# Patient Record
Sex: MALE | Race: WHITE | Employment: FULL TIME | URBAN - METROPOLITAN AREA
[De-identification: names, ages, dates, MRNs, and addresses within clinical notes are randomized per-mention and may not be internally consistent; named-entity substitution may affect disease eponyms.]

---

## 2017-05-22 ENCOUNTER — OFFICE VISIT (OUTPATIENT)
Dept: FAMILY MEDICINE | Facility: CLINIC | Age: 32
End: 2017-05-22

## 2017-05-22 VITALS
SYSTOLIC BLOOD PRESSURE: 113 MMHG | DIASTOLIC BLOOD PRESSURE: 67 MMHG | WEIGHT: 161 LBS | HEIGHT: 75 IN | TEMPERATURE: 98.5 F | HEART RATE: 70 BPM | BODY MASS INDEX: 20.02 KG/M2

## 2017-05-22 DIAGNOSIS — A74.9 CHLAMYDIA INFECTION: ICD-10-CM

## 2017-05-22 DIAGNOSIS — Z11.3 SCREENING FOR STDS (SEXUALLY TRANSMITTED DISEASES): Primary | ICD-10-CM

## 2017-05-22 PROCEDURE — 87491 CHLMYD TRACH DNA AMP PROBE: CPT | Performed by: NURSE PRACTITIONER

## 2017-05-22 PROCEDURE — 87389 HIV-1 AG W/HIV-1&-2 AB AG IA: CPT | Performed by: NURSE PRACTITIONER

## 2017-05-22 PROCEDURE — 87340 HEPATITIS B SURFACE AG IA: CPT | Performed by: NURSE PRACTITIONER

## 2017-05-22 PROCEDURE — 87591 N.GONORRHOEAE DNA AMP PROB: CPT | Performed by: NURSE PRACTITIONER

## 2017-05-22 PROCEDURE — 36415 COLL VENOUS BLD VENIPUNCTURE: CPT | Performed by: NURSE PRACTITIONER

## 2017-05-22 PROCEDURE — 86803 HEPATITIS C AB TEST: CPT | Performed by: NURSE PRACTITIONER

## 2017-05-22 ASSESSMENT — ANXIETY QUESTIONNAIRES
5. BEING SO RESTLESS THAT IT IS HARD TO SIT STILL: SEVERAL DAYS
2. NOT BEING ABLE TO STOP OR CONTROL WORRYING: SEVERAL DAYS
GAD7 TOTAL SCORE: 12
3. WORRYING TOO MUCH ABOUT DIFFERENT THINGS: NEARLY EVERY DAY
7. FEELING AFRAID AS IF SOMETHING AWFUL MIGHT HAPPEN: NOT AT ALL
IF YOU CHECKED OFF ANY PROBLEMS ON THIS QUESTIONNAIRE, HOW DIFFICULT HAVE THESE PROBLEMS MADE IT FOR YOU TO DO YOUR WORK, TAKE CARE OF THINGS AT HOME, OR GET ALONG WITH OTHER PEOPLE: VERY DIFFICULT
6. BECOMING EASILY ANNOYED OR IRRITABLE: NEARLY EVERY DAY
1. FEELING NERVOUS, ANXIOUS, OR ON EDGE: NEARLY EVERY DAY

## 2017-05-22 ASSESSMENT — PATIENT HEALTH QUESTIONNAIRE - PHQ9: 5. POOR APPETITE OR OVEREATING: SEVERAL DAYS

## 2017-05-22 NOTE — MR AVS SNAPSHOT
"              After Visit Summary   2017    Aditya Mares    MRN: [id number]           Patient Information     Date Of Birth          1985        Visit Information        Provider Department      2017 3:40 PM Maria Alejandra Alvarenga APRN CNP [facility]        Today's Diagnoses     Screening for STDs (sexually transmitted diseases)    -  1      Care Instructions    Chlamydia and gonorrhea   HIV, Hep B, Hep C screening         Follow-ups after your visit        Who to contact     If you have questions or need follow up information about today's clinic visit or your schedule please contact [facility] directly at [phone].  Normal or non-critical lab and imaging results will be communicated to you by MyChart, letter or phone within 4 business days after the clinic has received the results. If you do not hear from us within 7 days, please contact the clinic through MyChart or phone. If you have a critical or abnormal lab result, we will notify you by phone as soon as possible.  Submit refill requests through [employer] or call your pharmacy and they will forward the refill request to us. Please allow 3 business days for your refill to be completed.          Additional Information About Your Visit        MyChart Information     [employer] lets you send messages to your doctor, view your test results, renew your prescriptions, schedule appointments and more. To sign up, go to [email or web address] . Click on \"Log in\" on the left side of the screen, which will take you to the Welcome page. Then click on \"Sign up Now\" on the right side of the page.     You will be asked to enter the access code listed below, as well as some personal information. Please follow the directions to create your username and password.     Your access code is: [email or web address]  Expires: 2017  4:10 PM     Your access code will  in 90 days. If you need help or a new code, please call your [facility] " "clinic or [phone].        Care EveryWhere ID     This is your Care EveryWhere ID. This could be used by other organizations to access your [place] medical records  [id number]        Your Vitals Were     Pulse Temperature Height BMI (Body Mass Index)          70 98.5  F (36.9  C) (Tympanic) 6' 3\" (1.905 m) 20.12 kg/m2         Blood Pressure from Last 3 Encounters:   05/22/17 113/67   06/15/16 122/74   03/07/16 113/76    Weight from Last 3 Encounters:   05/22/17 161 lb (73 kg)   06/15/16 180 lb (81.6 kg)   03/07/16 171 lb (77.6 kg)              We Performed the Following     Chlamydia trachomatis PCR     Hepatitis B surface antigen     Hepatitis C antibody     HIV Antigen Antibody Combo     Neisseria gonorrhoeae PCR        Primary Care Provider Office Phone #    [facility] [phone] [address] [facility] [phone]        Thank you!     Thank you for choosing [facility]  for your care. Our goal is always to provide you with excellent care. Hearing back from our patients is one way we can continue to improve our services. Please take a few minutes to complete the written survey that you may receive in the mail after your visit with us. Thank you!             Your Updated Medication List - Protect others around you: Learn how to safely use, store and throw away your medicines at www.disposemymeds.org.      Notice  As of 5/22/2017  4:10 PM    You have not been prescribed any medications.      "

## 2017-05-22 NOTE — PROGRESS NOTES
"  SUBJECTIVE:                                                    Aditya Mares is a 31 year old male who presents to clinic today for the following health issues: had slight penial discharge few days ago, would like to have STD screening. Reports history of gonorrhea in the past.   Denies frequent urination, blood in urine, pain upon urination, hematuria and flank pain.     Problem list and histories reviewed & adjusted, as indicated.  Additional history: as documented    Labs reviewed in EPIC    Reviewed and updated as needed this visit by clinical staff  Tobacco  Allergies  Meds  Med Hx  Surg Hx  Fam Hx  Soc Hx      Reviewed and updated as needed this visit by Provider         ROS:  Constitutional, HEENT, cardiovascular, pulmonary, gi and gu systems are negative, except as otherwise noted.    OBJECTIVE:                                                    /67  Pulse 70  Temp 98.5  F (36.9  C) (Tympanic)  Ht 6' 3\" (1.905 m)  Wt 161 lb (73 kg)  BMI 20.12 kg/m2  Body mass index is 20.12 kg/(m^2).  GENERAL: healthy, alert and no distress   (male): deferred     Diagnostic Test Results:  -pending      ASSESSMENT/PLAN:                                                      1. Screening for STDs (sexually transmitted diseases)    - Chlamydia trachomatis PCR  - Neisseria gonorrhoeae PCR  - HIV Antigen Antibody Combo  - Hepatitis B surface antigen  - Hepatitis C antibody    2. Chlamydia infection.   -positive for chlamydia  -start Azithromycin 1000 mg single dose  -recommend CG screening in 3 months      See Patient Instructions    FRED Sprague [facility]  "

## 2017-05-22 NOTE — NURSING NOTE
"Chief Complaint   Patient presents with     URI     Has had a cold for 18 days.      STD     Having some discharge from his penis.        Initial /67  Pulse 70  Temp 98.5  F (36.9  C) (Tympanic)  Ht 6' 3\" (1.905 m)  Wt 161 lb (73 kg)  BMI 20.12 kg/m2 Estimated body mass index is 20.12 kg/(m^2) as calculated from the following:    Height as of this encounter: 6' 3\" (1.905 m).    Weight as of this encounter: 161 lb (73 kg).  Medication Reconciliation: complete  "

## 2017-05-23 LAB
C TRACH DNA SPEC QL NAA+PROBE: ABNORMAL
HBV SURFACE AG SERPL QL IA: NONREACTIVE
HCV AB SERPL QL IA: NORMAL
HIV 1+2 AB+HIV1 P24 AG SERPL QL IA: NORMAL
N GONORRHOEA DNA SPEC QL NAA+PROBE: NORMAL
SPECIMEN SOURCE: ABNORMAL
SPECIMEN SOURCE: NORMAL

## 2017-05-23 ASSESSMENT — ANXIETY QUESTIONNAIRES: GAD7 TOTAL SCORE: 12

## 2017-05-23 ASSESSMENT — PATIENT HEALTH QUESTIONNAIRE - PHQ9: SUM OF ALL RESPONSES TO PHQ QUESTIONS 1-9: 1

## 2017-05-24 RX ORDER — AZITHROMYCIN 500 MG/1
1000 TABLET, FILM COATED ORAL ONCE
Qty: 2 TABLET | Refills: 0 | Status: SHIPPED | OUTPATIENT
Start: 2017-05-24 | End: 2017-05-24

## 2018-06-06 ENCOUNTER — OFFICE VISIT (OUTPATIENT)
Dept: FAMILY MEDICINE | Facility: CLINIC | Age: 33
End: 2018-06-06
Payer: COMMERCIAL

## 2018-06-06 VITALS
BODY MASS INDEX: 22.53 KG/M2 | HEART RATE: 59 BPM | HEIGHT: 73 IN | SYSTOLIC BLOOD PRESSURE: 120 MMHG | OXYGEN SATURATION: 96 % | RESPIRATION RATE: 12 BRPM | DIASTOLIC BLOOD PRESSURE: 69 MMHG | WEIGHT: 170 LBS | TEMPERATURE: 98.7 F

## 2018-06-06 DIAGNOSIS — R35.0 URINARY FREQUENCY: Primary | ICD-10-CM

## 2018-06-06 DIAGNOSIS — Z11.3 SCREEN FOR STD (SEXUALLY TRANSMITTED DISEASE): ICD-10-CM

## 2018-06-06 LAB
ALBUMIN UR-MCNC: NEGATIVE MG/DL
AMORPH CRY #/AREA URNS HPF: ABNORMAL /HPF
APPEARANCE UR: CLEAR
BACTERIA #/AREA URNS HPF: ABNORMAL /HPF
BILIRUB UR QL STRIP: NEGATIVE
COLOR UR AUTO: YELLOW
CREAT SERPL-MCNC: 0.77 MG/DL (ref 0.66–1.25)
GFR SERPL CREATININE-BSD FRML MDRD: >90 ML/MIN/1.7M2
GLUCOSE SERPL-MCNC: 84 MG/DL (ref 70–99)
GLUCOSE UR STRIP-MCNC: NEGATIVE MG/DL
HGB UR QL STRIP: ABNORMAL
KETONES UR STRIP-MCNC: NEGATIVE MG/DL
LEUKOCYTE ESTERASE UR QL STRIP: NEGATIVE
NITRATE UR QL: NEGATIVE
PH UR STRIP: 7.5 PH (ref 5–7)
RBC #/AREA URNS AUTO: ABNORMAL /HPF
SOURCE: ABNORMAL
SP GR UR STRIP: 1.02 (ref 1–1.03)
UROBILINOGEN UR STRIP-ACNC: 0.2 EU/DL (ref 0.2–1)
WBC #/AREA URNS AUTO: ABNORMAL /HPF

## 2018-06-06 PROCEDURE — 86704 HEP B CORE ANTIBODY TOTAL: CPT | Performed by: INTERNAL MEDICINE

## 2018-06-06 PROCEDURE — 86706 HEP B SURFACE ANTIBODY: CPT | Performed by: INTERNAL MEDICINE

## 2018-06-06 PROCEDURE — 81001 URINALYSIS AUTO W/SCOPE: CPT | Performed by: INTERNAL MEDICINE

## 2018-06-06 PROCEDURE — 99213 OFFICE O/P EST LOW 20 MIN: CPT | Performed by: INTERNAL MEDICINE

## 2018-06-06 PROCEDURE — 82565 ASSAY OF CREATININE: CPT | Performed by: INTERNAL MEDICINE

## 2018-06-06 PROCEDURE — 86803 HEPATITIS C AB TEST: CPT | Performed by: INTERNAL MEDICINE

## 2018-06-06 PROCEDURE — 36415 COLL VENOUS BLD VENIPUNCTURE: CPT | Performed by: INTERNAL MEDICINE

## 2018-06-06 PROCEDURE — 86780 TREPONEMA PALLIDUM: CPT | Performed by: INTERNAL MEDICINE

## 2018-06-06 PROCEDURE — 87340 HEPATITIS B SURFACE AG IA: CPT | Performed by: INTERNAL MEDICINE

## 2018-06-06 PROCEDURE — 87491 CHLMYD TRACH DNA AMP PROBE: CPT | Performed by: INTERNAL MEDICINE

## 2018-06-06 PROCEDURE — 82947 ASSAY GLUCOSE BLOOD QUANT: CPT | Performed by: INTERNAL MEDICINE

## 2018-06-06 PROCEDURE — 87591 N.GONORRHOEAE DNA AMP PROB: CPT | Performed by: INTERNAL MEDICINE

## 2018-06-06 PROCEDURE — 87389 HIV-1 AG W/HIV-1&-2 AB AG IA: CPT | Performed by: INTERNAL MEDICINE

## 2018-06-06 NOTE — PATIENT INSTRUCTIONS
No sign of UTI on your urine sample.  STD, kidney function, and diabetes testing is still in process- may take a few days for all tests to come back.  We'll let you know when we get results.

## 2018-06-06 NOTE — PROGRESS NOTES
"  SUBJECTIVE:   Aditya Mares is a 32 year old male who presents to clinic today for the following health issues:  Chief Complaint   Patient presents with     STD     x 2 days, feeling the need to urinate. nothing extreme     Genitourinary symptoms      Duration: x 2 weeks     Description:  Feeling the need to urinate all the time but then doesn't really go    Intensity:  mild    Accompanying signs and symptoms (fever/discharge/nausea/vomiting/back or abdominal pain):  No fevers, chills, dysuria, hematuria, penile discharge, or genital lesions    History (frequent UTI's/kidney stones/prostate problems): None  Sexually active: YES- had unprotected intercourse a month ago    Precipitating or alleviating factors: None    Therapies tried and outcome: none       Aditya had chlamydia a year ago and was treated with azithromycin.  He did have penile discharge then, so current episode is different.      Problem list and histories reviewed & adjusted, as indicated.  Additional history: as documented    Patient Active Problem List   Diagnosis     Patellar tracking disorder     Tobacco use disorder     History reviewed. No pertinent surgical history.    Social History   Substance Use Topics     Smoking status: Former Smoker     Quit date: 5/8/2017     Smokeless tobacco: Never Used     Alcohol use No     Family History   Problem Relation Age of Onset     HEART DISEASE Maternal Grandfather          No current outpatient prescriptions on file.     No Known Allergies    Reviewed and updated as needed this visit by clinical staff  Tobacco  Allergies  Meds  Med Hx  Surg Hx  Fam Hx  Soc Hx      Reviewed and updated as needed this visit by Provider         ROS:  Constitutional and gu systems are negative, except as otherwise noted.    OBJECTIVE:     /69 (BP Location: Left arm, Patient Position: Chair, Cuff Size: Adult Regular)  Pulse 59  Temp 98.7  F (37.1  C) (Tympanic)  Resp 12  Ht 6' 1\" (1.854 m)  Wt 170 lb (77.1 " kg)  SpO2 96%  BMI 22.43 kg/m2  Body mass index is 22.43 kg/(m^2).    GENERAL: healthy, alert and no distress  RESP: lungs clear to auscultation - no rales, rhonchi or wheezes  CV: regular rate and rhythm, normal S1 S2, no S3 or S4, no murmur, click or rub  ABDOMEN: soft, non tender, no hepatosplenomegaly, no masses and bowel sounds normal  BACK: no CVA tenderness     Diagnostic Test Results:  Results for orders placed or performed in visit on 06/06/18 (from the past 24 hour(s))   *UA reflex to Microscopic and Culture ([facility] and [facility] (except [place] and [place])   Result Value Ref Range    Color Urine Yellow     Appearance Urine Clear     Glucose Urine Negative NEG^Negative mg/dL    Bilirubin Urine Negative NEG^Negative    Ketones Urine Negative NEG^Negative mg/dL    Specific Gravity Urine 1.020 1.003 - 1.035    Blood Urine Trace (A) NEG^Negative    pH Urine 7.5 (H) 5.0 - 7.0 pH    Protein Albumin Urine Negative NEG^Negative mg/dL    Urobilinogen Urine 0.2 0.2 - 1.0 EU/dL    Nitrite Urine Negative NEG^Negative    Leukocyte Esterase Urine Negative NEG^Negative    Source Midstream Urine    Urine Microscopic   Result Value Ref Range    WBC Urine 0 - 5 OTO5^0 - 5 /HPF    RBC Urine O - 2 OTO2^O - 2 /HPF    Bacteria Urine Few (A) NEG^Negative /HPF    Amorphous Crystals Few (A) NEG^Negative /HPF       ASSESSMENT/PLAN:       1. Urinary frequency    Aditya presents with 2 weeks of urinary frequency.  He did have unprotected intercourse with a new partner a month ago, so certainly could be STD related.  Was treated for chlamydia last year with no recheck completed.  U/A not indicative of UTI today.  Doing a check for renal function and diabetes as well in case these are related.       - NEISSERIA GONORRHOEA PCR  - CHLAMYDIA TRACHOMATIS PCR  - Glucose  - *UA reflex to Microscopic and Culture ([facility] and [facility] (except [facility])  - Creatinine  - Urine Microscopic    2. Screen for STD  (sexually transmitted disease)    He would like full STD screening.     - NEISSERIA GONORRHOEA PCR  - CHLAMYDIA TRACHOMATIS PCR  - Treponema Abs w Reflex to RPR and Titer  - Hepatitis B core antibody  - Hepatitis B Surface Antibody  - Hepatitis B surface antigen  - Hepatitis C antibody  - HIV Antigen Antibody Combo      Kirill Ladd MD  [facility]

## 2018-06-06 NOTE — MR AVS SNAPSHOT
"              After Visit Summary   6/6/2018    Aditya Mares    MRN: [id number]           Patient Information     Date Of Birth          1985        Visit Information        Provider Department      6/6/2018 4:00 PM Kirill Ladd MD [facility]        Today's Diagnoses     Urinary frequency    -  1    Screen for STD (sexually transmitted disease)          Care Instructions    No sign of UTI on your urine sample.  STD, kidney function, and diabetes testing is still in process- may take a few days for all tests to come back.  We'll let you know when we get results.           Follow-ups after your visit        Who to contact     If you have questions or need follow up information about today's clinic visit or your schedule please contact [facility] directly at [phone].  Normal or non-critical lab and imaging results will be communicated to you by MyChart, letter or phone within 4 business days after the clinic has received the results. If you do not hear from us within 7 days, please contact the clinic through MyChart or phone. If you have a critical or abnormal lab result, we will notify you by phone as soon as possible.  Submit refill requests through [employer] or call your pharmacy and they will forward the refill request to us. Please allow 3 business days for your refill to be completed.          Additional Information About Your Visit        Care EveryWhere ID     This is your Care EveryWhere ID. This could be used by other organizations to access your [place] medical records  [id number]        Your Vitals Were     Pulse Temperature Respirations Height Pulse Oximetry BMI (Body Mass Index)    59 98.7  F (37.1  C) (Tympanic) 12 6' 1\" (1.854 m) 96% 22.43 kg/m2       Blood Pressure from Last 3 Encounters:   06/06/18 120/69   05/22/17 113/67   06/15/16 122/74    Weight from Last 3 Encounters:   06/06/18 170 lb (77.1 kg)   05/22/17 161 lb (73 kg)   06/15/16 180 lb (81.6 kg)    "           We Performed the Following     *UA reflex to Microscopic and Culture ([facility] (except [place] and [place])     CHLAMYDIA TRACHOMATIS PCR     Creatinine     Glucose     Hepatitis B core antibody     Hepatitis B Surface Antibody     Hepatitis B surface antigen     Hepatitis C antibody     HIV Antigen Antibody Combo     NEISSERIA GONORRHOEA PCR     Treponema Abs w Reflex to RPR and Titer     Urine Microscopic        Primary Care Provider Office Phone # Fax #    [facility] [phone] [address] [facility] [phone]        Equal Access to Services     LUISANA MONTES : Hadii aad ku hadasho Soomaali, waaxda luqadaha, qaybta kaalmada adeegyada, waxay idiin hayaan adeeg kharash stanislaw . So [facility] [phone].    ATENCIÓN: Si habla español, tiene a roth disposición servicios gratuitos de asistencia lingüística. Llame al [phone].    We comply with applicable federal civil rights laws and [place] laws. We do not discriminate on the basis of race, color, national origin, age, disability, sex, sexual orientation, or gender identity.            Thank you!     Thank you for choosing [facility]  for your care. Our goal is always to provide you with excellent care. Hearing back from our patients is one way we can continue to improve our services. Please take a few minutes to complete the written survey that you may receive in the mail after your visit with us. Thank you!             Your Updated Medication List - Protect others around you: Learn how to safely use, store and throw away your medicines at www.disposemymeds.org.      Notice  As of 6/6/2018  4:54 PM    You have not been prescribed any medications.

## 2018-06-07 LAB
C TRACH DNA SPEC QL NAA+PROBE: NEGATIVE
HBV CORE AB SERPL QL IA: NONREACTIVE
HBV SURFACE AB SERPL IA-ACNC: 0 M[IU]/ML
HBV SURFACE AG SERPL QL IA: NONREACTIVE
HCV AB SERPL QL IA: NONREACTIVE
HIV 1+2 AB+HIV1 P24 AG SERPL QL IA: NONREACTIVE
N GONORRHOEA DNA SPEC QL NAA+PROBE: NEGATIVE
SPECIMEN SOURCE: NORMAL
SPECIMEN SOURCE: NORMAL
T PALLIDUM AB SER QL: NONREACTIVE
